# Patient Record
Sex: MALE | Race: BLACK OR AFRICAN AMERICAN | Employment: UNEMPLOYED | URBAN - METROPOLITAN AREA
[De-identification: names, ages, dates, MRNs, and addresses within clinical notes are randomized per-mention and may not be internally consistent; named-entity substitution may affect disease eponyms.]

---

## 2017-05-17 ENCOUNTER — ANESTHESIA EVENT (OUTPATIENT)
Dept: SURGERY | Age: 62
End: 2017-05-17
Payer: MEDICARE

## 2017-05-17 ENCOUNTER — HOSPITAL ENCOUNTER (OUTPATIENT)
Dept: SURGERY | Age: 62
Discharge: HOME | End: 2017-05-17

## 2017-05-17 VITALS
HEART RATE: 54 BPM | HEIGHT: 69 IN | DIASTOLIC BLOOD PRESSURE: 98 MMHG | SYSTOLIC BLOOD PRESSURE: 167 MMHG | TEMPERATURE: 97.4 F | RESPIRATION RATE: 17 BRPM | BODY MASS INDEX: 21.77 KG/M2 | OXYGEN SATURATION: 100 % | WEIGHT: 147 LBS

## 2017-05-17 RX ORDER — SODIUM CHLORIDE 0.9 % (FLUSH) 0.9 %
5-10 SYRINGE (ML) INJECTION AS NEEDED
Status: CANCELLED | OUTPATIENT
Start: 2017-05-17

## 2017-05-17 RX ORDER — SODIUM CHLORIDE, SODIUM LACTATE, POTASSIUM CHLORIDE, CALCIUM CHLORIDE 600; 310; 30; 20 MG/100ML; MG/100ML; MG/100ML; MG/100ML
100 INJECTION, SOLUTION INTRAVENOUS CONTINUOUS
Status: CANCELLED | OUTPATIENT
Start: 2017-05-17 | End: 2017-05-17

## 2017-05-17 RX ORDER — OXYCODONE HYDROCHLORIDE 5 MG/1
10 TABLET ORAL
Status: CANCELLED | OUTPATIENT
Start: 2017-05-17

## 2017-05-17 RX ORDER — HYDROMORPHONE HYDROCHLORIDE 2 MG/ML
0.5 INJECTION, SOLUTION INTRAMUSCULAR; INTRAVENOUS; SUBCUTANEOUS
Status: CANCELLED | OUTPATIENT
Start: 2017-05-17

## 2017-05-17 NOTE — PERIOP NOTES
Patient verified name, , and surgery as listed in [facility]. TYPE  CASE:1B  Orders per surgeon:   Received  Labs per surgeon:NONE  Labs per anesthesia protocol: NONE  EKG  :  NONE      Patient provided with handouts including guide to surgery , transfusions, pain management and hand hygiene for the family and community. Pt verbalizes understanding of all pre-op instructions . Instructed that family must be present in building at all times. Nothing to eat or drink after midnight the night prior to surgery. Hibiclens and instructions given per hospital policy. Instructed patient to continue  previous medications as prescribed prior to surgery and  to take the following medications the day of surgery according to anesthesia guidelines : NONE. BRING EYE DROPS       Original medication prescription bottles NOT visualized during patient appointment. Continue all previous medications unless otherwise directed. Instructed patient to hold  the following medications prior to surgery: NONE      Patient verbalized understanding of all instructions and provided all medical/health information to the best of their ability.

## 2017-05-18 ENCOUNTER — ANESTHESIA (OUTPATIENT)
Dept: SURGERY | Age: 62
End: 2017-05-18
Payer: MEDICARE

## 2017-05-18 ENCOUNTER — HOSPITAL ENCOUNTER (OUTPATIENT)
Age: 62
Setting detail: OUTPATIENT SURGERY
Discharge: HOME | End: 2017-05-18
Attending: OPHTHALMOLOGY | Admitting: OPHTHALMOLOGY
Payer: MEDICARE

## 2017-05-18 VITALS
BODY MASS INDEX: 21.47 KG/M2 | DIASTOLIC BLOOD PRESSURE: 80 MMHG | RESPIRATION RATE: 16 BRPM | TEMPERATURE: 97.6 F | OXYGEN SATURATION: 98 % | HEART RATE: 62 BPM | WEIGHT: 145.38 LBS | SYSTOLIC BLOOD PRESSURE: 150 MMHG

## 2017-05-18 PROCEDURE — 76010000162 HC OR TIME 1.5 TO 2 HR INTENSV-TIER 1: Performed by: OPHTHALMOLOGY

## 2017-05-18 PROCEDURE — 74011250637 HC RX REV CODE- 250/637: Performed by: ANESTHESIOLOGY

## 2017-05-18 PROCEDURE — 77030020143 HC AIRWY LARYN INTUB CGAS -A: Performed by: ANESTHESIOLOGY

## 2017-05-18 PROCEDURE — 74011250636 HC RX REV CODE- 250/636

## 2017-05-18 PROCEDURE — 77030002975 HC SUT PLN J&J -B: Performed by: OPHTHALMOLOGY

## 2017-05-18 PROCEDURE — 77030032490 HC SLV COMPR SCD KNE COVD -B: Performed by: OPHTHALMOLOGY

## 2017-05-18 PROCEDURE — 77030012829 HC CAUT BPLR KIRW -B: Performed by: OPHTHALMOLOGY

## 2017-05-18 PROCEDURE — 77030002916 HC SUT ETHLN J&J -A: Performed by: OPHTHALMOLOGY

## 2017-05-18 PROCEDURE — 77030030827 HC RETRCTR IRIS DISP ALCN -C: Performed by: OPHTHALMOLOGY

## 2017-05-18 PROCEDURE — 77030014436 HC IMPL RETIN SLV DTCH -A: Performed by: OPHTHALMOLOGY

## 2017-05-18 PROCEDURE — 77030013372: Performed by: OPHTHALMOLOGY

## 2017-05-18 PROCEDURE — 77030022403 HC IMPL RETIN STRP DTCH -B: Performed by: OPHTHALMOLOGY

## 2017-05-18 PROCEDURE — 77030032623 HC KT VITRCMY TOT PLS ALCN -F: Performed by: OPHTHALMOLOGY

## 2017-05-18 PROCEDURE — 77030002937 HC SUT MERS J&J -B: Performed by: OPHTHALMOLOGY

## 2017-05-18 PROCEDURE — 77030018837 HC SOL IRR OPTH ALCN -A: Performed by: OPHTHALMOLOGY

## 2017-05-18 PROCEDURE — 77030003029 HC SUT VCRL J&J -B: Performed by: OPHTHALMOLOGY

## 2017-05-18 PROCEDURE — 74011000250 HC RX REV CODE- 250: Performed by: OPHTHALMOLOGY

## 2017-05-18 PROCEDURE — 74011250636 HC RX REV CODE- 250/636: Performed by: ANESTHESIOLOGY

## 2017-05-18 PROCEDURE — 74011000250 HC RX REV CODE- 250

## 2017-05-18 PROCEDURE — 77030018838 HC SOL IRR OPTH ALCN -B: Performed by: OPHTHALMOLOGY

## 2017-05-18 PROCEDURE — 76210000063 HC OR PH I REC FIRST 0.5 HR: Performed by: OPHTHALMOLOGY

## 2017-05-18 PROCEDURE — 74011250636 HC RX REV CODE- 250/636: Performed by: OPHTHALMOLOGY

## 2017-05-18 PROCEDURE — 77030008547 HC TBNG OPHTH BD -A: Performed by: OPHTHALMOLOGY

## 2017-05-18 PROCEDURE — 77030002996 HC SUT SLK J&J -A: Performed by: OPHTHALMOLOGY

## 2017-05-18 PROCEDURE — 77030017621 HC NDL OPHTH1 ALCN -B: Performed by: OPHTHALMOLOGY

## 2017-05-18 PROCEDURE — 76210000020 HC REC RM PH II FIRST 0.5 HR: Performed by: OPHTHALMOLOGY

## 2017-05-18 PROCEDURE — 76060000035 HC ANESTHESIA 2 TO 2.5 HR: Performed by: OPHTHALMOLOGY

## 2017-05-18 DEVICE — STRIP SCLER W3.5XL125MM THK0.75MM SIL SLD STYL 41/S2970: Type: IMPLANTABLE DEVICE | Site: EYE | Status: FUNCTIONAL

## 2017-05-18 DEVICE — SLEEVE SCLER BCKL L30IN OD21IN ID1IN SIL STYL 70: Type: IMPLANTABLE DEVICE | Site: EYE | Status: FUNCTIONAL

## 2017-05-18 RX ORDER — LIDOCAINE HYDROCHLORIDE 20 MG/ML
INJECTION, SOLUTION EPIDURAL; INFILTRATION; INTRACAUDAL; PERINEURAL AS NEEDED
Status: DISCONTINUED | OUTPATIENT
Start: 2017-05-18 | End: 2017-05-18 | Stop reason: HOSPADM

## 2017-05-18 RX ORDER — PHENYLEPHRINE HYDROCHLORIDE 25 MG/ML
1 SOLUTION/ DROPS OPHTHALMIC
Status: DISCONTINUED | OUTPATIENT
Start: 2017-05-18 | End: 2017-05-18 | Stop reason: HOSPADM

## 2017-05-18 RX ORDER — LIDOCAINE HYDROCHLORIDE 20 MG/ML
INJECTION, SOLUTION INFILTRATION; PERINEURAL AS NEEDED
Status: DISCONTINUED | OUTPATIENT
Start: 2017-05-18 | End: 2017-05-18 | Stop reason: HOSPADM

## 2017-05-18 RX ORDER — OFLOXACIN 3 MG/ML
1 SOLUTION/ DROPS OPHTHALMIC
Status: DISCONTINUED | OUTPATIENT
Start: 2017-05-18 | End: 2017-05-18 | Stop reason: HOSPADM

## 2017-05-18 RX ORDER — LIDOCAINE HYDROCHLORIDE 10 MG/ML
0.3 INJECTION INFILTRATION; PERINEURAL ONCE
Status: DISCONTINUED | OUTPATIENT
Start: 2017-05-18 | End: 2017-05-18 | Stop reason: HOSPADM

## 2017-05-18 RX ORDER — SODIUM CHLORIDE 0.9 % (FLUSH) 0.9 %
5-10 SYRINGE (ML) INJECTION AS NEEDED
Status: DISCONTINUED | OUTPATIENT
Start: 2017-05-18 | End: 2017-05-18 | Stop reason: HOSPADM

## 2017-05-18 RX ORDER — MIDAZOLAM HYDROCHLORIDE 1 MG/ML
2 INJECTION, SOLUTION INTRAMUSCULAR; INTRAVENOUS
Status: DISCONTINUED | OUTPATIENT
Start: 2017-05-18 | End: 2017-05-18 | Stop reason: HOSPADM

## 2017-05-18 RX ORDER — FAMOTIDINE 20 MG/1
20 TABLET, FILM COATED ORAL ONCE
Status: COMPLETED | OUTPATIENT
Start: 2017-05-18 | End: 2017-05-18

## 2017-05-18 RX ORDER — GENTAMICIN SULFATE 3 MG/ML
SOLUTION/ DROPS OPHTHALMIC AS NEEDED
Status: DISCONTINUED | OUTPATIENT
Start: 2017-05-18 | End: 2017-05-18 | Stop reason: HOSPADM

## 2017-05-18 RX ORDER — SODIUM CHLORIDE 0.9 % (FLUSH) 0.9 %
5-10 SYRINGE (ML) INJECTION EVERY 8 HOURS
Status: DISCONTINUED | OUTPATIENT
Start: 2017-05-18 | End: 2017-05-18 | Stop reason: HOSPADM

## 2017-05-18 RX ORDER — NEOMYCIN SULFATE, POLYMYXIN B SULFATE, AND DEXAMETHASONE 3.5; 10000; 1 MG/G; [USP'U]/G; MG/G
OINTMENT OPHTHALMIC AS NEEDED
Status: DISCONTINUED | OUTPATIENT
Start: 2017-05-18 | End: 2017-05-18 | Stop reason: HOSPADM

## 2017-05-18 RX ORDER — CYCLOPENTOLATE HYDROCHLORIDE 20 MG/ML
1 SOLUTION/ DROPS OPHTHALMIC
Status: DISCONTINUED | OUTPATIENT
Start: 2017-05-18 | End: 2017-05-18 | Stop reason: HOSPADM

## 2017-05-18 RX ORDER — ONDANSETRON 2 MG/ML
INJECTION INTRAMUSCULAR; INTRAVENOUS AS NEEDED
Status: DISCONTINUED | OUTPATIENT
Start: 2017-05-18 | End: 2017-05-18 | Stop reason: HOSPADM

## 2017-05-18 RX ORDER — HYDROCODONE BITARTRATE AND ACETAMINOPHEN 7.5; 325 MG/1; MG/1
1 TABLET ORAL
Qty: 20 TAB | Refills: 0 | Status: SHIPPED | OUTPATIENT
Start: 2017-05-18 | End: 2018-01-23 | Stop reason: CLARIF

## 2017-05-18 RX ORDER — BETAMETHASONE SODIUM PHOSPHATE AND BETAMETHASONE ACETATE 3; 3 MG/ML; MG/ML
INJECTION, SUSPENSION INTRA-ARTICULAR; INTRALESIONAL; INTRAMUSCULAR; SOFT TISSUE AS NEEDED
Status: DISCONTINUED | OUTPATIENT
Start: 2017-05-18 | End: 2017-05-18 | Stop reason: HOSPADM

## 2017-05-18 RX ORDER — DEXAMETHASONE SODIUM PHOSPHATE 4 MG/ML
INJECTION, SOLUTION INTRA-ARTICULAR; INTRALESIONAL; INTRAMUSCULAR; INTRAVENOUS; SOFT TISSUE AS NEEDED
Status: DISCONTINUED | OUTPATIENT
Start: 2017-05-18 | End: 2017-05-18 | Stop reason: HOSPADM

## 2017-05-18 RX ORDER — BRIMONIDINE TARTRATE 1.5 MG/ML
SOLUTION/ DROPS OPHTHALMIC AS NEEDED
Status: DISCONTINUED | OUTPATIENT
Start: 2017-05-18 | End: 2017-05-18 | Stop reason: HOSPADM

## 2017-05-18 RX ORDER — PROPOFOL 10 MG/ML
INJECTION, EMULSION INTRAVENOUS AS NEEDED
Status: DISCONTINUED | OUTPATIENT
Start: 2017-05-18 | End: 2017-05-18 | Stop reason: HOSPADM

## 2017-05-18 RX ORDER — SODIUM CHLORIDE 9 MG/ML
INJECTION INTRAMUSCULAR; INTRAVENOUS; SUBCUTANEOUS AS NEEDED
Status: DISCONTINUED | OUTPATIENT
Start: 2017-05-18 | End: 2017-05-18 | Stop reason: HOSPADM

## 2017-05-18 RX ORDER — FENTANYL CITRATE 50 UG/ML
INJECTION, SOLUTION INTRAMUSCULAR; INTRAVENOUS AS NEEDED
Status: DISCONTINUED | OUTPATIENT
Start: 2017-05-18 | End: 2017-05-18 | Stop reason: HOSPADM

## 2017-05-18 RX ORDER — CEFAZOLIN SODIUM 1 G/3ML
INJECTION, POWDER, FOR SOLUTION INTRAMUSCULAR; INTRAVENOUS AS NEEDED
Status: DISCONTINUED | OUTPATIENT
Start: 2017-05-18 | End: 2017-05-18 | Stop reason: HOSPADM

## 2017-05-18 RX ORDER — BUPIVACAINE HYDROCHLORIDE 5 MG/ML
INJECTION, SOLUTION EPIDURAL; INTRACAUDAL AS NEEDED
Status: DISCONTINUED | OUTPATIENT
Start: 2017-05-18 | End: 2017-05-18 | Stop reason: HOSPADM

## 2017-05-18 RX ORDER — SODIUM CHLORIDE, SODIUM LACTATE, POTASSIUM CHLORIDE, CALCIUM CHLORIDE 600; 310; 30; 20 MG/100ML; MG/100ML; MG/100ML; MG/100ML
100 INJECTION, SOLUTION INTRAVENOUS CONTINUOUS
Status: DISCONTINUED | OUTPATIENT
Start: 2017-05-18 | End: 2017-05-18 | Stop reason: HOSPADM

## 2017-05-18 RX ADMIN — FAMOTIDINE 20 MG: 20 TABLET ORAL at 11:09

## 2017-05-18 RX ADMIN — LIDOCAINE HYDROCHLORIDE 100 MG: 20 INJECTION, SOLUTION EPIDURAL; INFILTRATION; INTRACAUDAL; PERINEURAL at 13:03

## 2017-05-18 RX ADMIN — FENTANYL CITRATE 50 MCG: 50 INJECTION, SOLUTION INTRAMUSCULAR; INTRAVENOUS at 13:09

## 2017-05-18 RX ADMIN — PROPOFOL 180 MG: 10 INJECTION, EMULSION INTRAVENOUS at 13:03

## 2017-05-18 RX ADMIN — DEXAMETHASONE SODIUM PHOSPHATE 10 MG: 4 INJECTION, SOLUTION INTRA-ARTICULAR; INTRALESIONAL; INTRAMUSCULAR; INTRAVENOUS; SOFT TISSUE at 13:11

## 2017-05-18 RX ADMIN — CYCLOPENTOLATE HYDROCHLORIDE 1 DROP: 20 SOLUTION/ DROPS OPHTHALMIC at 11:03

## 2017-05-18 RX ADMIN — SODIUM CHLORIDE, SODIUM LACTATE, POTASSIUM CHLORIDE, AND CALCIUM CHLORIDE 100 ML/HR: 600; 310; 30; 20 INJECTION, SOLUTION INTRAVENOUS at 11:08

## 2017-05-18 RX ADMIN — ONDANSETRON 4 MG: 2 INJECTION INTRAMUSCULAR; INTRAVENOUS at 13:32

## 2017-05-18 RX ADMIN — FENTANYL CITRATE 50 MCG: 50 INJECTION, SOLUTION INTRAMUSCULAR; INTRAVENOUS at 13:01

## 2017-05-18 RX ADMIN — PHENYLEPHRINE HYDROCHLORIDE 1 DROP: 25 SOLUTION/ DROPS OPHTHALMIC at 11:03

## 2017-05-18 NOTE — ANESTHESIA PREPROCEDURE EVALUATION
Anesthetic History   No history of anesthetic complications            Review of Systems / Medical History  Patient summary reviewed and pertinent labs reviewed    Pulmonary          Smoker         Neuro/Psych       CVA: no residual symptoms       Cardiovascular    Hypertension: well controlled              Exercise tolerance: >4 METS     GI/Hepatic/Renal     GERD: well controlled           Endo/Other             Other Findings              Physical Exam    Airway  Mallampati: II  TM Distance: 4 - 6 cm  Neck ROM: normal range of motion   Mouth opening: Normal     Cardiovascular  Regular rate and rhythm,  S1 and S2 normal,  no murmur, click, rub, or gallop             Dental    Dentition: Poor dentition     Pulmonary  Breath sounds clear to auscultation               Abdominal  GI exam deferred       Other Findings            Anesthetic Plan    ASA: 3  Anesthesia type: general          Induction: Intravenous  Anesthetic plan and risks discussed with: Patient

## 2017-05-18 NOTE — DISCHARGE INSTRUCTIONS
[facility] [address] MD Eda Campos MD [facility]. MD Silvia Draper. Ruben Randolph MD    [phone] or [phone] or [phone] or [phone]    Post Operative Instructions for Retina and Vitreous Surgery Patients    The following are a few guidelines that you should observe for two weeks after surgery:    1. Avoid stooping over, lifting heavy weights or bumping your head. 2.  Special positioning:   Right side face down      May sit up    3. No extensive traveling except what is necessary. If a gas air bubble was put in your      eye at surgery - avoid air travel until you consult your doctor. 4.  You may shave after the third day. 5.  You may watch television, read, cook and wash dishes as long as it does not interfere        with special positioning instructions. 6.  Alcoholic beverages may be used in moderation. 7.  No sexual intercourse. 8.  You  may bathe the eyelids daily with cotton or a tissue moistened with warm tap       water. Do not bathe the eyeball itself. 9.  Some discharge from the operated eye is to be expected. This should gradually        improve. 10. Change the eye pad at least once daily. You may discontinue the eye pad whenever        comfortable to do so (usually three days after the operation). Wear the eye shield or        glasses at all times. 11. If you experience increasing pain, decreasing vision, or pus like discharge, call the        Office. 12. Medication Instructions:         Prednisolone 1% - one drop in the operated eye __4____ times a day. Atropine 1% - one drop in the operated eye at bedtime. Tobradex Ointment - apply in operated eye 4 times a day as needed. Ocuflot______(antibiotic drop) - one drop in operated eye 4 times a day.          BRING ALL EYE DROPS TO YOU POSTOPERATIVE APPOINTMENT! 13. Return appointment at the    [facility]        On  May 19 @ 930am     Voiced or demonstrated understanding:  YES    DISCHARGE SUMMARY from Nurse    PATIENT INSTRUCTIONS:    After general anesthesia or intravenous sedation, for 24 hours or while taking prescription Narcotics:  · Limit your activities  · Do not drive and operate hazardous machinery  · Do not make important personal or business decisions  · Do  not drink alcoholic beverages  · If you have not urinated within 8 hours after discharge, please contact your surgeon on call. *  Please give a list of your current medications to your Primary Care Provider. *  Please update this list whenever your medications are discontinued, doses are      changed, or new medications (including over-the-counter products) are added. *  Please carry medication information at all times in case of emergency situations. These are general instructions for a healthy lifestyle:    No smoking/ No tobacco products/ Avoid exposure to second hand smoke    Surgeon General's Warning:  Quitting smoking now greatly reduces serious risk to your health. Obesity, smoking, and sedentary lifestyle greatly increases your risk for illness    A healthy diet, regular physical exercise & weight monitoring are important for maintaining a healthy lifestyle    You may be retaining fluid if you have a history of heart failure or if you experience any of the following symptoms:  Weight gain of 3 pounds or more overnight or 5 pounds in a week, increased swelling in our hands or feet or shortness of breath while lying flat in bed. Please call your doctor as soon as you notice any of these symptoms; do not wait until your next office visit.     Recognize signs and symptoms of STROKE:    F-face looks uneven    A-arms unable to move or move unevenly    S-speech slurred or non-existent    T-time-call 911 as soon as signs and symptoms begin-DO NOT go Back to bed or wait to see if you get better-TIME IS BRAIN.

## 2017-05-18 NOTE — BRIEF OP NOTE
BRIEF OPERATIVE NOTE    Date of Procedure: 5/18/2017   Preoperative Diagnosis: Retinal detachment, tractional, right [H33.41]  Postoperative Diagnosis: Retinal detachment, tractional, right [H33.41]    Procedure(s):  VITRECTOMY POSTERIOR 25 GAUGE/ LASER/ GAS FLUID EXCHANGE-C3F8/ LENSECTOMY/ SCLERAL BUCKLE PLACEMENT/ RIGHT  Surgeon(s) and Role:     * Joss Wagner MD - Primary         Assistant Staff:       Surgical Staff:  Circ-1: Zachery Solorzano RN  Scrub Tech-1: Sobieski Cole  Event Time In   Incision Start 1309   Incision Close 1446     Anesthesia: General   Estimated Blood Loss: 0  Specimens: * No specimens in log *   Findings: 0   Complications: 0  Implants:   Implant Name Type Inv. Item Serial No.  Lot No. LRB No. Used Action   IMPL RETIN SLV RND 1X2.1X30MM --  - [id number]  IMPL RETIN SLV RND 1X2.1X30MM --   [facility] [id number] Right 1 Implanted   IMPL RETIN STRP 0.75X3. [id number] --  - [id number]   IMPL RETIN STRP 0.75X3. [id number] --    [facility] [id number] Right 1 Implanted

## 2017-05-18 NOTE — IP AVS SNAPSHOT
Albino Bradford 
 
 
 [address] 
[phone] Patient: Ivett Mora MRN: [id number] IUO:[id number]/[id number] You are allergic to the following Allergen Reactions Other Medication Other (comments) Hayfever, mold, ragweed, environmental and some foods, but patient not sure which ones. Causes him to become stopped up in sinuses per pt. Recent Documentation Weight BMI Smoking Status 65.9 kg 21.47 kg/m2 Current Every Day Smoker Emergency Contacts Name Discharge Info Relation Home Work Mobile Yary Lawson  Other Relative [6] [phone] About your hospitalization You were admitted on:  May 18, 2017 [facility] You were discharged on:  May 18, 2017 Unit phone number:  [phone] Why you were hospitalized Your primary diagnosis was:  Not on File Providers Seen During Your Hospitalizations Provider Role Specialty Primary office phone Andres Wylie MD Attending Provider Ophthalmology [phone] Your Primary Care Physician (PCP) Primary Care Physician Office Phone Office Fax Gerhardt Blend [phone] Follow-up Information Follow up With Details Comments Contact Info Perry Canchola MD   [facility][1] 
[address] [facility] [address] [phone] Current Discharge Medication List  
  
START taking these medications Dose & Instructions Dispensing Information Comments Morning Noon Evening Bedtime HYDROcodone-acetaminophen 7.5-325 mg per tablet Commonly known as:  Vasile Ngo Your last dose was: Your next dose is:    
   
   
 Dose:  1 Tab Take 1 Tab by mouth every six (6) hours as needed for Pain. Max Daily Amount: 4 Tabs. Quantity:  20 Tab Refills:  0 CONTINUE these medications which have NOT CHANGED Dose & Instructions Dispensing Information Comments Morning Noon Evening Bedtime BENADRYL 25 mg capsule Generic drug:  diphenhydrAMINE Your last dose was: Your next dose is:    
   
   
 Dose:  25 mg Take 25 mg by mouth daily as needed. Refills:  0 Where to Get Your Medications Information on where to get these meds will be given to you by the nurse or doctor. ! Ask your nurse or doctor about these medications HYDROcodone-acetaminophen 7.5-325 mg per tablet Discharge Instructions [facility] [place] [address] MD Royce Marx MD Yancey Hickory. Stalin Canchola MD                    [facility]. Patricia Mckeon, [address] or [phone] or [phone] or [phone] Post Operative Instructions for Retina and Vitreous Surgery Patients The following are a few guidelines that you should observe for two weeks after surgery: 1. Avoid stooping over, lifting heavy weights or bumping your head. 2.  Special positioning:   Right side face down      May sit up 3. No extensive traveling except what is necessary. If a gas air bubble was put in your      eye at surgery - avoid air travel until you consult your doctor. 4.  You may shave after the third day. 5.  You may watch television, read, cook and wash dishes as long as it does not interfere 
      with special positioning instructions. 6.  Alcoholic beverages may be used in moderation. 7.  No sexual intercourse. 8.  You  may bathe the eyelids daily with cotton or a tissue moistened with warm tap 
     water. Do not bathe the eyeball itself. 9.  Some discharge from the operated eye is to be expected. This should gradually  
     improve. 10. Change the eye pad at least once daily. You may discontinue the eye pad whenever 
      comfortable to do so (usually three days after the operation). Wear the eye shield or 
      glasses at all times. 11. If you experience increasing pain, decreasing vision, or pus like discharge, call the 
      Office. 12. Medication Instructions: 
       Prednisolone 1% - one drop in the operated eye __4____ times a day. Atropine 1% - one drop in the operated eye at bedtime. Tobradex Ointment - apply in operated eye 4 times a day as needed. Ocuflot______(antibiotic drop) - one drop in operated eye 4 times a day. BRING ALL EYE DROPS TO YOU POSTOPERATIVE APPOINTMENT! 13. Return appointment at the    [facility] On  May 19 @ 930am    
Voiced or demonstrated understanding:  YES 
 
DISCHARGE SUMMARY from Nurse PATIENT INSTRUCTIONS: 
 
After general anesthesia or intravenous sedation, for 24 hours or while taking prescription Narcotics: · Limit your activities · Do not drive and operate hazardous machinery · Do not make important personal or business decisions · Do  not drink alcoholic beverages · If you have not urinated within 8 hours after discharge, please contact your surgeon on call. *  Please give a list of your current medications to your Primary Care Provider. *  Please update this list whenever your medications are discontinued, doses are 
    changed, or new medications (including over-the-counter products) are added. *  Please carry medication information at all times in case of emergency situations. These are general instructions for a healthy lifestyle: No smoking/ No tobacco products/ Avoid exposure to second hand smoke Surgeon General's Warning:  Quitting smoking now greatly reduces serious risk to your health. Obesity, smoking, and sedentary lifestyle greatly increases your risk for illness A healthy diet, regular physical exercise & weight monitoring are important for maintaining a healthy lifestyle You may be retaining fluid if you have a history of heart failure or if you experience any of the following symptoms:  Weight gain of 3 pounds or more overnight or 5 pounds in a week, increased swelling in our hands or feet or shortness of breath while lying flat in bed. Please call your doctor as soon as you notice any of these symptoms; do not wait until your next office visit. Recognize signs and symptoms of STROKE: 
 
F-face looks uneven A-arms unable to move or move unevenly S-speech slurred or non-existent T-time-call 911 as soon as signs and symptoms begin-DO NOT go Back to bed or wait to see if you get better-TIME IS BRAIN. Discharge Orders None Introducing [facility] SERVICES! Laurel Loza introduces GoInstant patient portal. Now you can access parts of your medical record, email your doctor's office, and request medication refills online. 1. In your internet browser, go to [email or web address] 2. Click on the First Time User? Click Here link in the Sign In box. You will see the New Member Sign Up page. 3. Enter your GoInstant Access Code exactly as it appears below. You will not need to use this code after youve completed the sign-up process. If you do not sign up before the expiration date, you must request a new code. · GoInstant Access Code: [email or web address] Expires: 8/10/2017  1:27 PM 
 
4. Enter the last four digits of your Social Security Number (xxxx) and Date of Birth (mm/dd/yyyy) as indicated and click Submit. You will be taken to the next sign-up page. 5. Create a GoInstant ID. This will be your GoInstant login ID and cannot be changed, so think of one that is secure and easy to remember. 6. Create a WaveSyndicatet password. You can change your password at any time. 7. Enter your Password Reset Question and Answer. This can be used at a later time if you forget your password. 8. Enter your e-mail address. You will receive e-mail notification when new information is available in [address] 9. Click Sign Up. You can now view and download portions of your medical record. 10. Click the Download Summary menu link to download a portable copy of your medical information. If you have questions, please visit the Frequently Asked Questions section of the [employer] website. Remember, [employer] is NOT to be used for urgent needs. For medical emergencies, dial 911. Now available from your iPhone and Android! General Information Please provide this summary of care documentation to your next provider. Patient Signature:  ____________________________________________________________ Date:  ____________________________________________________________  
  
[facility] Provider Signature:  ____________________________________________________________ Date:  ____________________________________________________________

## 2017-05-18 NOTE — IP AVS SNAPSHOT
Current Discharge Medication List  
  
START taking these medications Dose & Instructions Dispensing Information Comments Morning Noon Evening Bedtime HYDROcodone-acetaminophen 7.5-325 mg per tablet Commonly known as:  Joaquin Sauceda Your last dose was: Your next dose is:    
   
   
 Dose:  1 Tab Take 1 Tab by mouth every six (6) hours as needed for Pain. Max Daily Amount: 4 Tabs. Quantity:  20 Tab Refills:  0 CONTINUE these medications which have NOT CHANGED Dose & Instructions Dispensing Information Comments Morning Noon Evening Bedtime BENADRYL 25 mg capsule Generic drug:  diphenhydrAMINE Your last dose was: Your next dose is:    
   
   
 Dose:  25 mg Take 25 mg by mouth daily as needed. Refills:  0 Where to Get Your Medications Information on where to get these meds will be given to you by the nurse or doctor. ! Ask your nurse or doctor about these medications HYDROcodone-acetaminophen 7.5-325 mg per tablet

## 2017-05-19 NOTE — OP NOTES
[facility] [address]   OPERATIVE REPORT       Name:  Ella Michelle   MR#:  [id number]   :  1955   Account #:  [de-identified]   Date of Adm:  2017       PREPROCEDURE DIAGNOSES:   1. Recurrent retinal detachment, macula off, right eye. 2. Dense nuclear sclerotic cataract, right eye.   3. 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 POSTPROCEDURE DIAGNOSES:   1. Recurrent retinal detachment, macula off, right eye. 2. Dense nuclear sclerotic cataract, right eye.   3. 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 NAME OF PROCEDURE:   1. Pars plana vitrectomy with repair of complex retinal   detachment utilizing laser, fluid gas exchange, and scleral   buckle. 2. Lensectomy all in the right. SURGEON: Loyda Hsieh MD.    ANESTHESIA: General laryngotracheal mask. COMPLICATIONS: None. INDICATIONS FOR PROCEDURE: Significant recurrent inferior   detachment in an eye, which had a recent giant retinal tear,   retinal detachment. The giant tear appeared stable and flat but   he had fluid extending from approximately 2 o'clock to 9 o'clock   with some elevation of laser, especially in the nasal periphery. I did not see any definite open breaks, but visualization was   difficult due to the dense cataract which was present. Discussing his options, risks and benefits, I recommended   vitrectomy with scleral buckling and possible oil, but I thought   that I would try to avoid the oil if possible because the level   of PVR did not seem to be dramatic. Understanding the risks,   benefits, and alternatives, the patient elected to proceed. SUMMARY OF THE OPERATION: After adequate preoperative evaluation   and informed consent had been obtained, the patient was brought   to the operative suite. IV access and sedation was obtained   without complication. The operative site was verified as the   right eye.  The eye was prepped and after induction of general   laryngotracheal mask anesthesia retrobulbar injection consisting   of a 50/50 mixture of Marcaine and Xylocaine with Wydase was   given. Good anesthesia and akinesia noted. The eye was prepped   and draped in the usual fashion for vitreoretinal surgery. Lid   speculum was placed in the floor of the eye and indirect   ophthalmoscopy confirmed the presence of the previously   mentioned detachment. I still cannot identify defect. The o pupil   was mid dilated with some difficulty with peripheral   visualization. At this time, I did go ahead and the conjunctiva   was opened 360 degrees, all muscles with 2-0 silk sutures. Quadrants were inspected and were healthy. Fairly large eye. Marking   depressor was used to anh the posterior aspect of the previous   laser, I felt this would be a good position for buckle   placement. A 41 band was placed around the eye, joined   superonasally with a 70 sleeve and fixed in all quadrants with   5-0 Mersilene sutures. It was not pulled tight at this time. Three separate 25-gauge cannulas were placed. Infusion was   verified and turned to a pressure of 35 mmHg. It was felt with   the visualization at this time that lensectomy was the next   step, therefore, 20-gauge MVR blade was used to create a   sclerotomy as well as to incise the lens capsule. The fragmatome   was then used to o complete fragmatome. This lens was actually   fairly dense. Good release of all lens material was removed with   the presence of anterior PVR I did go ahead and do the   capsulectomy as well. During the fragmatome, the pupil did began   to come down and was it necessary to support the anterior   chamber with viscoelastic as well as to place 3 iris retractors. All lens material was removed from the eye and indirect   ophthalmoscopy then was used to investigate the the detachment. The previously mentioned detachment was present.  There was   elevation with what appeared to be some loop traction   supranasally with several small holes in the areas of previous   laser treatment, but no other defects were seen. The buckle was   then pulled to an appropriate height to support this anterior   traction and there was no real traction that could be released   with the microvitrectomy instrument. The macula was thin and   cystic. The nerve still seemed pink with some pigment on the   surface. A retinotomy was then created just posterior to the   buckle nasally and the retina was drained under liquid, good   drainage, and complete reattachment was achieved. A fluid-air   exchange was then performed. I did use a microvitrectomy   instrument to remove the viscoelastic prior to performing   fluid/air exchange. Good drainage of fairly viscous subretinal   fluid was achieved and a nice reattachment of the retina was   noted. Laser was then placed around the retinotomy site as well   as in the supranasal quadrant and slight posterior laser   treatment around the previously-placed laser in case any other   small defects were seen. The buckle had been pulled and all   sutures were trimmed and tied it felt this was an excellent   result. A 40 mL exchange of 10% C3F8 was then carried out. The   eye was left at a pressure of less than 17 mmHg after closing   all sclerotomy sites at the end with 7-0 Vicryl. The conjunctiva   was closed with 6-0 plain after irrigating the quadrants with   Ancef and Celestone. The eye was patched and shielded with   Maxitrol ointment as well as Alphagan, Cyclogyl and Maxitrol   ointment. He was taken to recovery in good condition.         MD Ari Mcmillan / Endy   D:  05/18/2017   15:01   T:  05/19/2017   09:45   Job #:  [id number]

## 2018-01-23 ENCOUNTER — HOSPITAL ENCOUNTER (OUTPATIENT)
Dept: SURGERY | Age: 63
Discharge: HOME | End: 2018-01-23

## 2018-01-23 VITALS
SYSTOLIC BLOOD PRESSURE: 147 MMHG | BODY MASS INDEX: 20.96 KG/M2 | OXYGEN SATURATION: 99 % | HEART RATE: 59 BPM | RESPIRATION RATE: 20 BRPM | HEIGHT: 70 IN | WEIGHT: 146.44 LBS | DIASTOLIC BLOOD PRESSURE: 87 MMHG | TEMPERATURE: 97.6 F

## 2018-01-23 RX ORDER — METHOCARBAMOL 500 MG/1
500 TABLET, FILM COATED ORAL
COMMUNITY

## 2018-01-23 RX ORDER — LANOLIN ALCOHOL/MO/W.PET/CERES
400 CREAM (GRAM) TOPICAL DAILY
COMMUNITY

## 2018-01-23 RX ORDER — CIPROFLOXACIN HYDROCHLORIDE 3.5 MG/ML
1 SOLUTION/ DROPS TOPICAL
Status: CANCELLED | OUTPATIENT
Start: 2018-01-25

## 2018-01-23 NOTE — PERIOP NOTES
Patient verified name, , and surgery as listed in [facility]. Patient provided medical/health information and PTA medications to the best of their ability. TYPE  CASE:1b  Orders per surgeon: Received  and dated 18. Labs per surgeon:none  Labs per anesthesia protocol: none  EKG  :  none    Patient provided with and instructed on education handouts including Guide to Surgery, blood transfusions, pain management, and hand hygiene for the family and community, and [facility] brochure. Nicole mist soap and instructions given per hospital policy. Instructed patient to continue previous medications as prescribed prior to surgery unless otherwise directed and to take the following medications the day of surgery according to anesthesia guidelines : none. Bring eye drops on the dos . Instructed patient to hold  the following medications: none. Original medication prescription bottles not visualized during patient appointment. Patient teach back successful and patient demonstrates knowledge of instruction.

## 2018-01-25 ENCOUNTER — ANESTHESIA (OUTPATIENT)
Dept: SURGERY | Age: 63
End: 2018-01-25
Payer: MEDICARE

## 2018-01-25 ENCOUNTER — ANESTHESIA EVENT (OUTPATIENT)
Dept: SURGERY | Age: 63
End: 2018-01-25
Payer: MEDICARE

## 2018-01-25 ENCOUNTER — HOSPITAL ENCOUNTER (OUTPATIENT)
Age: 63
Setting detail: OUTPATIENT SURGERY
Discharge: HOME | End: 2018-01-25
Attending: OPHTHALMOLOGY | Admitting: OPHTHALMOLOGY
Payer: MEDICARE

## 2018-01-25 VITALS
OXYGEN SATURATION: 97 % | TEMPERATURE: 97.8 F | DIASTOLIC BLOOD PRESSURE: 72 MMHG | RESPIRATION RATE: 22 BRPM | BODY MASS INDEX: 21.25 KG/M2 | HEART RATE: 73 BPM | SYSTOLIC BLOOD PRESSURE: 121 MMHG | WEIGHT: 146 LBS

## 2018-01-25 PROCEDURE — 77030003029 HC SUT VCRL J&J -B: Performed by: OPHTHALMOLOGY

## 2018-01-25 PROCEDURE — 77030002917 HC SUT ETHLN J&J -B: Performed by: OPHTHALMOLOGY

## 2018-01-25 PROCEDURE — 77030018838 HC SOL IRR OPTH ALCN -B: Performed by: OPHTHALMOLOGY

## 2018-01-25 PROCEDURE — 77030026083 HC FCPS OPHTH GRSH DSP ALCN -C: Performed by: OPHTHALMOLOGY

## 2018-01-25 PROCEDURE — 77030006694 HC BLD OPHTH CRESC ALCN -B: Performed by: OPHTHALMOLOGY

## 2018-01-25 PROCEDURE — 77030018846 HC SOL IRR STRL H20 ICUM -A: Performed by: OPHTHALMOLOGY

## 2018-01-25 PROCEDURE — V2630 ANTER CHAMBER INTRAOCUL LENS: HCPCS | Performed by: OPHTHALMOLOGY

## 2018-01-25 PROCEDURE — 77030008546 HC TBNG OPHTH ALCN -B: Performed by: OPHTHALMOLOGY

## 2018-01-25 PROCEDURE — 76210000063 HC OR PH I REC FIRST 0.5 HR: Performed by: OPHTHALMOLOGY

## 2018-01-25 PROCEDURE — 77030038091: Performed by: OPHTHALMOLOGY

## 2018-01-25 PROCEDURE — 74011250636 HC RX REV CODE- 250/636

## 2018-01-25 PROCEDURE — 77030016570 HC BLNKT BAIR HGGR 3M -B: Performed by: NURSE ANESTHETIST, CERTIFIED REGISTERED

## 2018-01-25 PROCEDURE — 77030020143 HC AIRWY LARYN INTUB CGAS -A: Performed by: NURSE ANESTHETIST, CERTIFIED REGISTERED

## 2018-01-25 PROCEDURE — 74011250636 HC RX REV CODE- 250/636: Performed by: OPHTHALMOLOGY

## 2018-01-25 PROCEDURE — 74011000250 HC RX REV CODE- 250: Performed by: OPHTHALMOLOGY

## 2018-01-25 PROCEDURE — 76060000033 HC ANESTHESIA 1 TO 1.5 HR: Performed by: OPHTHALMOLOGY

## 2018-01-25 PROCEDURE — 77030006685 HC BLD OPHTH ALCN -B: Performed by: OPHTHALMOLOGY

## 2018-01-25 PROCEDURE — 77030018837 HC SOL IRR OPTH ALCN -A: Performed by: OPHTHALMOLOGY

## 2018-01-25 PROCEDURE — 77030006704 HC BLD OPHTH SLT ALCN -B: Performed by: OPHTHALMOLOGY

## 2018-01-25 PROCEDURE — 74011250636 HC RX REV CODE- 250/636: Performed by: ANESTHESIOLOGY

## 2018-01-25 PROCEDURE — 74011000250 HC RX REV CODE- 250

## 2018-01-25 PROCEDURE — 74011000254 HC RX REV CODE- 254: Performed by: OPHTHALMOLOGY

## 2018-01-25 PROCEDURE — 76010000149 HC OR TIME 1 TO 1.5 HR: Performed by: OPHTHALMOLOGY

## 2018-01-25 PROCEDURE — 77030032490 HC SLV COMPR SCD KNE COVD -B: Performed by: OPHTHALMOLOGY

## 2018-01-25 PROCEDURE — 77030032513 HC KNF OPHTH STAB DISP WALC -A: Performed by: OPHTHALMOLOGY

## 2018-01-25 PROCEDURE — 76210000020 HC REC RM PH II FIRST 0.5 HR: Performed by: OPHTHALMOLOGY

## 2018-01-25 PROCEDURE — 77030032623 HC KT VITRCMY TOT PLS ALCN -F: Performed by: OPHTHALMOLOGY

## 2018-01-25 PROCEDURE — 77030008547 HC TBNG OPHTH BD -A: Performed by: OPHTHALMOLOGY

## 2018-01-25 DEVICE — IMPLANTABLE DEVICE: Type: IMPLANTABLE DEVICE | Status: FUNCTIONAL

## 2018-01-25 RX ORDER — ONDANSETRON 2 MG/ML
INJECTION INTRAMUSCULAR; INTRAVENOUS AS NEEDED
Status: DISCONTINUED | OUTPATIENT
Start: 2018-01-25 | End: 2018-01-25 | Stop reason: HOSPADM

## 2018-01-25 RX ORDER — INDOCYANINE GREEN AND WATER 25 MG
KIT INJECTION AS NEEDED
Status: DISCONTINUED | OUTPATIENT
Start: 2018-01-25 | End: 2018-01-25 | Stop reason: HOSPADM

## 2018-01-25 RX ORDER — SODIUM CHLORIDE 0.9 % (FLUSH) 0.9 %
5-10 SYRINGE (ML) INJECTION AS NEEDED
Status: DISCONTINUED | OUTPATIENT
Start: 2018-01-25 | End: 2018-01-25 | Stop reason: HOSPADM

## 2018-01-25 RX ORDER — SODIUM CHLORIDE, SODIUM LACTATE, POTASSIUM CHLORIDE, CALCIUM CHLORIDE 600; 310; 30; 20 MG/100ML; MG/100ML; MG/100ML; MG/100ML
75 INJECTION, SOLUTION INTRAVENOUS CONTINUOUS
Status: DISCONTINUED | OUTPATIENT
Start: 2018-01-25 | End: 2018-01-25 | Stop reason: HOSPADM

## 2018-01-25 RX ORDER — NEOMYCIN SULFATE, POLYMYXIN B SULFATE, AND DEXAMETHASONE 3.5; 10000; 1 MG/G; [USP'U]/G; MG/G
OINTMENT OPHTHALMIC AS NEEDED
Status: DISCONTINUED | OUTPATIENT
Start: 2018-01-25 | End: 2018-01-25 | Stop reason: HOSPADM

## 2018-01-25 RX ORDER — SODIUM CHLORIDE 9 MG/ML
INJECTION INTRAMUSCULAR; INTRAVENOUS; SUBCUTANEOUS AS NEEDED
Status: DISCONTINUED | OUTPATIENT
Start: 2018-01-25 | End: 2018-01-25 | Stop reason: HOSPADM

## 2018-01-25 RX ORDER — PROPOFOL 10 MG/ML
INJECTION, EMULSION INTRAVENOUS AS NEEDED
Status: DISCONTINUED | OUTPATIENT
Start: 2018-01-25 | End: 2018-01-25 | Stop reason: HOSPADM

## 2018-01-25 RX ORDER — GLYCOPYRROLATE 0.2 MG/ML
INJECTION INTRAMUSCULAR; INTRAVENOUS AS NEEDED
Status: DISCONTINUED | OUTPATIENT
Start: 2018-01-25 | End: 2018-01-25 | Stop reason: HOSPADM

## 2018-01-25 RX ORDER — CYCLOPENTOLATE HYDROCHLORIDE 20 MG/ML
1 SOLUTION/ DROPS OPHTHALMIC
Status: DISCONTINUED | OUTPATIENT
Start: 2018-01-25 | End: 2018-01-25 | Stop reason: HOSPADM

## 2018-01-25 RX ORDER — OXYCODONE HYDROCHLORIDE 5 MG/1
5 TABLET ORAL
Status: DISCONTINUED | OUTPATIENT
Start: 2018-01-25 | End: 2018-01-25 | Stop reason: HOSPADM

## 2018-01-25 RX ORDER — LIDOCAINE HYDROCHLORIDE 20 MG/ML
INJECTION, SOLUTION EPIDURAL; INFILTRATION; INTRACAUDAL; PERINEURAL AS NEEDED
Status: DISCONTINUED | OUTPATIENT
Start: 2018-01-25 | End: 2018-01-25 | Stop reason: HOSPADM

## 2018-01-25 RX ORDER — HYDROMORPHONE HYDROCHLORIDE 2 MG/ML
0.5 INJECTION, SOLUTION INTRAMUSCULAR; INTRAVENOUS; SUBCUTANEOUS
Status: DISCONTINUED | OUTPATIENT
Start: 2018-01-25 | End: 2018-01-25 | Stop reason: HOSPADM

## 2018-01-25 RX ORDER — LIDOCAINE HYDROCHLORIDE 20 MG/ML
INJECTION, SOLUTION INFILTRATION; PERINEURAL AS NEEDED
Status: DISCONTINUED | OUTPATIENT
Start: 2018-01-25 | End: 2018-01-25 | Stop reason: HOSPADM

## 2018-01-25 RX ORDER — PHENYLEPHRINE HYDROCHLORIDE 25 MG/ML
1 SOLUTION/ DROPS OPHTHALMIC
Status: DISCONTINUED | OUTPATIENT
Start: 2018-01-25 | End: 2018-01-25 | Stop reason: HOSPADM

## 2018-01-25 RX ORDER — BETAMETHASONE SODIUM PHOSPHATE AND BETAMETHASONE ACETATE 3; 3 MG/ML; MG/ML
INJECTION, SUSPENSION INTRA-ARTICULAR; INTRALESIONAL; INTRAMUSCULAR; SOFT TISSUE AS NEEDED
Status: DISCONTINUED | OUTPATIENT
Start: 2018-01-25 | End: 2018-01-25 | Stop reason: HOSPADM

## 2018-01-25 RX ORDER — FENTANYL CITRATE 50 UG/ML
INJECTION, SOLUTION INTRAMUSCULAR; INTRAVENOUS AS NEEDED
Status: DISCONTINUED | OUTPATIENT
Start: 2018-01-25 | End: 2018-01-25 | Stop reason: HOSPADM

## 2018-01-25 RX ORDER — EPHEDRINE SULFATE 50 MG/ML
INJECTION, SOLUTION INTRAVENOUS AS NEEDED
Status: DISCONTINUED | OUTPATIENT
Start: 2018-01-25 | End: 2018-01-25 | Stop reason: HOSPADM

## 2018-01-25 RX ORDER — OXYCODONE AND ACETAMINOPHEN 10; 325 MG/1; MG/1
1 TABLET ORAL AS NEEDED
Status: DISCONTINUED | OUTPATIENT
Start: 2018-01-25 | End: 2018-01-25 | Stop reason: HOSPADM

## 2018-01-25 RX ORDER — CEFAZOLIN SODIUM 1 G/3ML
INJECTION, POWDER, FOR SOLUTION INTRAMUSCULAR; INTRAVENOUS AS NEEDED
Status: DISCONTINUED | OUTPATIENT
Start: 2018-01-25 | End: 2018-01-25 | Stop reason: HOSPADM

## 2018-01-25 RX ADMIN — FENTANYL CITRATE 50 MCG: 50 INJECTION, SOLUTION INTRAMUSCULAR; INTRAVENOUS at 13:27

## 2018-01-25 RX ADMIN — FENTANYL CITRATE 25 MCG: 50 INJECTION, SOLUTION INTRAMUSCULAR; INTRAVENOUS at 14:12

## 2018-01-25 RX ADMIN — CYCLOPENTOLATE HYDROCHLORIDE 1 DROP: 20 SOLUTION/ DROPS OPHTHALMIC at 11:20

## 2018-01-25 RX ADMIN — FENTANYL CITRATE 25 MCG: 50 INJECTION, SOLUTION INTRAMUSCULAR; INTRAVENOUS at 13:43

## 2018-01-25 RX ADMIN — FENTANYL CITRATE 25 MCG: 50 INJECTION, SOLUTION INTRAMUSCULAR; INTRAVENOUS at 14:30

## 2018-01-25 RX ADMIN — PROPOFOL 200 MG: 10 INJECTION, EMULSION INTRAVENOUS at 13:38

## 2018-01-25 RX ADMIN — ONDANSETRON 4 MG: 2 INJECTION INTRAMUSCULAR; INTRAVENOUS at 14:15

## 2018-01-25 RX ADMIN — PHENYLEPHRINE HYDROCHLORIDE 1 DROP: 25 SOLUTION/ DROPS OPHTHALMIC at 11:20

## 2018-01-25 RX ADMIN — EPHEDRINE SULFATE 10 MG: 50 INJECTION, SOLUTION INTRAVENOUS at 13:57

## 2018-01-25 RX ADMIN — LIDOCAINE HYDROCHLORIDE 100 MG: 20 INJECTION, SOLUTION EPIDURAL; INFILTRATION; INTRACAUDAL; PERINEURAL at 13:38

## 2018-01-25 RX ADMIN — FENTANYL CITRATE 25 MCG: 50 INJECTION, SOLUTION INTRAMUSCULAR; INTRAVENOUS at 14:05

## 2018-01-25 RX ADMIN — EPHEDRINE SULFATE 5 MG: 50 INJECTION, SOLUTION INTRAVENOUS at 14:38

## 2018-01-25 RX ADMIN — SODIUM CHLORIDE, SODIUM LACTATE, POTASSIUM CHLORIDE, AND CALCIUM CHLORIDE 75 ML/HR: 600; 310; 30; 20 INJECTION, SOLUTION INTRAVENOUS at 11:19

## 2018-01-25 RX ADMIN — EPHEDRINE SULFATE 10 MG: 50 INJECTION, SOLUTION INTRAVENOUS at 13:46

## 2018-01-25 RX ADMIN — GLYCOPYRROLATE 0.1 MG: 0.2 INJECTION INTRAMUSCULAR; INTRAVENOUS at 13:51

## 2018-01-25 NOTE — DISCHARGE INSTRUCTIONS
[facility], [facility] MD Garrett Raza MD Furman Sprang. Kalvin Booker, MD Charity Osler. Sampson Madrid MD    [phone] or [phone] or [phone] or [phone]    Post Operative Instructions for Retina and Vitreous Surgery Patients    The following are a few guidelines that you should observe for two weeks after surgery:    1. Avoid stooping over, lifting heavy weights or bumping your head. 2.  Special positioning: Keep head elevated on 2-3 pillows. 3.  No extensive traveling except what is necessary. If a gas air bubble was put in your eye at surgery - avoid air travel until you consult your doctor. 4.  You may shave after the third day. 5.  You may watch television, read, cook and wash dishes as long as it does not interfere        with special positioning instructions. 6.  Alcoholic beverages may be used in moderation. 7.  No sexual intercourse. 8.  You  may bathe the eyelids daily with cotton or a tissue moistened with warm tap       water. Do not bathe the eyeball itself. 9.  Some discharge from the operated eye is to be expected. This should gradually        improve. 10. Change the eye pad at least once daily. You may discontinue the eye pad whenever        comfortable to do so (usually three days after the operation). Wear the eye shield or        glasses at all times. 11. If you experience increasing pain, decreasing vision, or pus like discharge, call the office. 12. Medication Instructions:         Prednisolone 1% - one drop in the operated eye 4 times a day. Ofloxacin (antibiotic drop) - one drop in operated eye 4 times a day. BRING ALL EYE DROPS TO YOU POSTOPERATIVE APPOINTMENT!     13. Return appointment at the [facility]        On  Friday, January 26 @ 9:30    DISCHARGE SUMMARY from Nurse    PATIENT INSTRUCTIONS:    After general anesthesia or intravenous sedation, for 24 hours or while taking prescription Narcotics:  · Limit your activities  · Do not drive and operate hazardous machinery  · Do not make important personal or business decisions  · Do  not drink alcoholic beverages  · If you have not urinated within 8 hours after discharge, please contact your surgeon on call. *  Please give a list of your current medications to your Primary Care Provider. *  Please update this list whenever your medications are discontinued, doses are      changed, or new medications (including over-the-counter products) are added. *  Please carry medication information at all times in case of emergency situations. These are general instructions for a healthy lifestyle:    No smoking/ No tobacco products/ Avoid exposure to second hand smoke    Surgeon General's Warning:  Quitting smoking now greatly reduces serious risk to your health. Obesity, smoking, and sedentary lifestyle greatly increases your risk for illness    A healthy diet, regular physical exercise & weight monitoring are important for maintaining a healthy lifestyle    You may be retaining fluid if you have a history of heart failure or if you experience any of the following symptoms:  Weight gain of 3 pounds or more overnight or 5 pounds in a week, increased swelling in our hands or feet or shortness of breath while lying flat in bed. Please call your doctor as soon as you notice any of these symptoms; do not wait until your next office visit. Recognize signs and symptoms of STROKE:    F-face looks uneven    A-arms unable to move or move unevenly    S-speech slurred or non-existent    T-time-call 911 as soon as signs and symptoms begin-DO NOT go       Back to bed or wait to see if you get better-TIME IS BRAIN.

## 2018-01-25 NOTE — IP AVS SNAPSHOT
[address] 
[phone] Patient: Bud Hickman MRN: [id number] BPA:[id number] About your hospitalization You were admitted on:  January 25, 2018 You last received care in the:  [facility] OP PACU You were discharged on:  January 25, 2018 Why you were hospitalized Your primary diagnosis was:  Not on File Follow-up Information Follow up With Details Comments Contact Info Ashli Boo MD   [address] [facility] [address] [address] 
[phone] Discharge Orders None A check anh indicates which time of day the medication should be taken. My Medications CONTINUE taking these medications Instructions Each Dose to Equal  
 Morning Noon Evening Bedtime BENADRYL 25 mg capsule Generic drug:  diphenhydrAMINE Your last dose was: Your next dose is: Take 25 mg by mouth daily as needed. 25 mg  
    
   
   
   
  
 magnesium oxide 400 mg tablet Commonly known as:  MAG-OX Your last dose was: Your next dose is: Take 400 mg by mouth daily. Indications: am-takes for [employer] 400 mg  
    
   
   
   
  
 methocarbamol 500 mg tablet Commonly known as:  ROBAXIN Your last dose was: Your next dose is: Take 500 mg by mouth daily as needed. 500 mg Discharge Instructions [facility] [place], [place] MD Tessa Enciso MD Dwaine Levan. MD Virgilio Lentz. [facility], [address] [facility] 74 or [phone] or [phone] or [phone] Post Operative Instructions for Retina and Vitreous Surgery Patients The following are a few guidelines that you should observe for two weeks after surgery: 1.  Avoid stooping over, lifting heavy weights or bumping your head. 2.  Special positioning: Keep head elevated on 2-3 pillows. 3.  No extensive traveling except what is necessary. If a gas air bubble was put in your eye at surgery - avoid air travel until you consult your doctor. 4.  You may shave after the third day. 5.  You may watch television, read, cook and wash dishes as long as it does not interfere 
      with special positioning instructions. 6.  Alcoholic beverages may be used in moderation. 7.  No sexual intercourse. 8.  You  may bathe the eyelids daily with cotton or a tissue moistened with warm tap 
     water. Do not bathe the eyeball itself. 9.  Some discharge from the operated eye is to be expected. This should gradually  
     improve. 10. Change the eye pad at least once daily. You may discontinue the eye pad whenever 
      comfortable to do so (usually three days after the operation). Wear the eye shield or 
      glasses at all times. 11. If you experience increasing pain, decreasing vision, or pus like discharge, call the office. 12. Medication Instructions: 
       Prednisolone 1% - one drop in the operated eye 4 times a day. Ofloxacin (antibiotic drop) - one drop in operated eye 4 times a day. BRING ALL EYE DROPS TO YOU POSTOPERATIVE APPOINTMENT! 13. Return appointment at the [facility] On  Friday, January 26 @ 9:30 DISCHARGE SUMMARY from Nurse PATIENT INSTRUCTIONS: 
 
After general anesthesia or intravenous sedation, for 24 hours or while taking prescription Narcotics: · Limit your activities · Do not drive and operate hazardous machinery · Do not make important personal or business decisions · Do  not drink alcoholic beverages · If you have not urinated within 8 hours after discharge, please contact your surgeon on call.  
 
*  Please give a list of your current medications to your Primary Care Provider. *  Please update this list whenever your medications are discontinued, doses are 
    changed, or new medications (including over-the-counter products) are added. *  Please carry medication information at all times in case of emergency situations. These are general instructions for a healthy lifestyle: No smoking/ No tobacco products/ Avoid exposure to second hand smoke Surgeon General's Warning:  Quitting smoking now greatly reduces serious risk to your health. Obesity, smoking, and sedentary lifestyle greatly increases your risk for illness A healthy diet, regular physical exercise & weight monitoring are important for maintaining a healthy lifestyle You may be retaining fluid if you have a history of heart failure or if you experience any of the following symptoms:  Weight gain of 3 pounds or more overnight or 5 pounds in a week, increased swelling in our hands or feet or shortness of breath while lying flat in bed. Please call your doctor as soon as you notice any of these symptoms; do not wait until your next office visit. Recognize signs and symptoms of STROKE: 
 
F-face looks uneven A-arms unable to move or move unevenly S-speech slurred or non-existent T-time-call 911 as soon as signs and symptoms begin-DO NOT go Back to bed or wait to see if you get better-TIME IS BRAIN. Introducing [facility]! Julian Grace introduces [employer] logo patient portal. Now you can access parts of your medical record, email your doctor's office, and request medication refills online. 1. In your internet browser, go to [email or web address] 2. Click on the First Time User? Click Here link in the Sign In box. You will see the New Member Sign Up page. 3. Enter your [employer] logo Access Code exactly as it appears below. You will not need to use this code after youve completed the sign-up process.  If you do not sign up before the expiration date, you must request a new code. · [employer] Access Code: [email or web address] Expires: 4/16/2018  3:19 PM 
 
4. Enter the last four digits of your Social Security Number (xxxx) and Date of Birth (mm/dd/yyyy) as indicated and click Submit. You will be taken to the next sign-up page. 5. Create a [employer] ID. This will be your [employer] login ID and cannot be changed, so think of one that is secure and easy to remember. 6. Create a [employer] password. You can change your password at any time. 7. Enter your Password Reset Question and Answer. This can be used at a later time if you forget your password. 8. Enter your e-mail address. You will receive e-mail notification when new information is available in [address] 9. Click Sign Up. You can now view and download portions of your medical record. 10. Click the Download Summary menu link to download a portable copy of your medical information. If you have questions, please visit the Frequently Asked Questions section of the [employer] website. Remember, [employer] is NOT to be used for urgent needs. For medical emergencies, dial 911. Now available from your iPhone and Android! Providers Seen During Your Hospitalization Provider Specialty Primary office phone Leopold Mola, MD Ophthalmology [phone] Your Primary Care Physician (PCP) Primary Care Physician Office Phone Office Fax Eliot Sanchez [phone] You are allergic to the following Allergen Reactions Other Medication Other (comments) Hayfever, mold, ragweed, environmental and some foods, but patient not sure which ones. Causes him to become stopped up in sinuses per pt. Recent Documentation Weight BMI Smoking Status 66.2 kg 21.25 kg/m2 Current Every Day Smoker Emergency Contacts Name Discharge Info Relation Home Work Mobile Yary Lawson  Other Relative [6] [phone] Kim Hanna  Other Relative [6] [phone] Patient Belongings The following personal items are in your possession at time of discharge: 
  Dental Appliances: None         Home Medications: Kept at bedside   Jewelry: None  Clothing: Pants, Shirt, Undergarments, Socks    Other Valuables: None Please provide this summary of care documentation to your next provider. Signatures-by signing, you are acknowledging that this After Visit Summary has been reviewed with you and you have received a copy. Patient Signature:  ____________________________________________________________ Date:  ____________________________________________________________  
  
Tmei Mealing Provider Signature:  ____________________________________________________________ Date:  ____________________________________________________________

## 2018-01-25 NOTE — BRIEF OP NOTE
BRIEF OPERATIVE NOTE    Date of Procedure: 1/25/2018   Preoperative Diagnosis: Macular puckering, right eye, aphakia [H35.371]  Postoperative Diagnosis: Macular puckering, right eye, aphakia [H35.371]    Procedure(s):  RIGHT VITRECTOMY POSTERIOR 25 GAUGE/SCAR TISSUE REMOVAL, Iridectomy  RIGHT INTRA OCULAR LENS SECONDARY  Surgeon(s) and Role:     * Samir Merino MD - Primary         Assistant Staff: None      Surgical Staff:  Circ-1: Fanny Valle RN  Scrub Tech-1: Romie Shankar  Event Time In   Incision Start 1350   Incision Close 1444     Anesthesia: MAC   Estimated Blood Loss: 0  Specimens: * No specimens in log *   Findings: 0   Complications: 0  Implants:   Implant Name Type Inv.  Item Serial No.  Lot No. LRB No. Used Action   13.0mm length 5.5mm optic 15.0 D Intraocular Lens     [id number] [employer]   Right 1 Implanted

## 2018-01-25 NOTE — ANESTHESIA POSTPROCEDURE EVALUATION
Post-Anesthesia Evaluation and Assessment    Patient: Rg Vasquez MRN: [id number]  SSN: [id number]    YOB: 1955  Age: 60 y.o. Sex: male       Cardiovascular Function/Vital Signs  Visit Vitals    BP (P) 140/83 (BP 1 Location: Left arm, BP Patient Position: At rest)    Pulse 80    Temp 36.6 °C (97.8 °F)    Resp 15    Wt 66.2 kg (146 lb)    SpO2 100%    BMI 21.25 kg/m2       Patient is status post general anesthesia for Procedure(s):  RIGHT VITRECTOMY POSTERIOR 25 GAUGE/SCAR TISSUE REMOVAL, Iridectomy  RIGHT INTRA OCULAR LENS SECONDARY. Nausea/Vomiting: None    Postoperative hydration reviewed and adequate. Pain:  Pain Scale 1: (P) Numeric (0 - 10) (01/25/18 1457)  Pain Intensity 1: (P) 0 (01/25/18 1457)   Managed    Neurological Status:   Neuro (WDL): (P) Exceptions to WDL (01/25/18 1457)   At baseline    Mental Status and Level of Consciousness: Arousable    Pulmonary Status:   O2 Device: Nasal cannula (01/25/18 1457)   Adequate oxygenation and airway patent    Complications related to anesthesia: None    Post-anesthesia assessment completed.  No concerns    Signed By: Bryant Garcia MD     January 25, 2018

## 2018-01-25 NOTE — BRIEF OP NOTE
BRIEF OPERATIVE NOTE    Date of Procedure: 1/25/2018   Preoperative Diagnosis: Macular puckering, right eye [H35.371]  Postoperative Diagnosis: Macular puckering, right eye [H35.371]    Procedure(s):  RIGHT VITRECTOMY POSTERIOR 25 GAUGE/SCAR TISSUE REMOVAL, Iridectomy  RIGHT INTRA OCULAR LENS SECONDARY  Surgeon(s) and Role:     * Jaydon Sanchez MD - Primary         Assistant Staff: None      Surgical Staff:  Circ-1: Alize Loja RN  Scrub Tech-1: Johnathon Cortes  Event Time In   Incision Start 1350   Incision Close 1444     Anesthesia: MAC   Estimated Blood Loss: 00  Specimens: * No specimens in log *   Findings: 0   Complications: 0  Implants:   Implant Name Type Inv.  Item Serial No.  Lot No. LRB No. Used Action   13.0mm length 5.5mm optic 15.0 D Intraocular Lens     [id number] FANNY [employer]   Right 1 Implanted

## 2018-01-25 NOTE — PERIOP NOTES
PACU DISCHARGE NOTE    Vital signs stable, pain well controlled, alert and oriented times three or at baseline, follow up per surgeon, no anesthetic complications. Discharge instructions given to pt and his niece. Pt is without c/o pain or discomfort. Pt is tolerating PO and has had his IV removed intact. Pt to discharge door via wheelchair and left in care of his niece, [place]. Pt states he has all his belongings and no discharge prescriptions given by Dr. Paulette Gaffney.

## 2018-01-25 NOTE — ANESTHESIA PREPROCEDURE EVALUATION
Anesthetic History   No history of anesthetic complications            Review of Systems / Medical History  Patient summary reviewed and pertinent labs reviewed    Pulmonary    COPD: mild      Smoker         Neuro/Psych         TIA and psychiatric history     Cardiovascular    Hypertension: well controlled              Exercise tolerance: >4 METS     GI/Hepatic/Renal     GERD: well controlled           Endo/Other        Arthritis     Other Findings            Physical Exam    Airway  Mallampati: II  TM Distance: > 6 cm  Neck ROM: normal range of motion   Mouth opening: Normal     Cardiovascular    Rhythm: regular           Dental    Dentition: Poor dentition     Pulmonary                 Abdominal  GI exam deferred       Other Findings            Anesthetic Plan    ASA: 3  Anesthesia type: general          Induction: Intravenous  Anesthetic plan and risks discussed with: Patient

## 2018-01-26 NOTE — OP NOTES
[facility]  OPERATIVE REPORT    Rita Cobb  MR#: [id number]  : 1955  ACCOUNT #: [de-identified]   DATE OF SERVICE: 2018    PREOPERATIVE DIAGNOSES:   1. Epiretinal proliferation, visually significant, right eye.  2.  History of 0 retinal tear, right eye. 3.  Aphakia, right eye. POSTOPERATIVE DIAGNOSES:   1. Epiretinal proliferation, visually significant, right eye.  2.  History of 0 retinal tear, right eye. 3.  Aphakia, right eye. PROCEDURES PERFORMED:   1.  Pars plana vitrectomy with stripping of epiretinal proliferation. 2.  Secondary implant lens placement, Lawrence [id number], 15 diopter. 3.  Peripheral iridectomy. SURGEON: Melchor La MD.     ASSISTANT: 0     ANESTHESIA:   Modified. COMPLICATIONS:  None. ESTIMATED BLOOD LOSS: 0    SPECIMENS REMOVED: 0    IMPLANTS: 0     INDICATIONS FOR THE PROCEDURE:  There is significant vision loss as a combination of epiretinal membrane with cystic maculopathy as well as aphakia. After discussing his options, risks and benefits, I recommended vitrectomy to remove surface proliferation and somewhat flatten cystic macula, and then placement of a secondary implant lens. Understanding the risks, benefits, and alternatives, the patient elected to proceed. SUMMARY OF OPERATION: After adequate preoperative evaluation and informed consent had been obtained, the patient was brought to the operative suite. IV access and sedation was obtained without complication. General laryngotracheal mask anesthetic was achieved. A timeout was performed. Retrobulbar injection was given, approximately 4.5 mL of a 50/50 mixture of Marcaine and Xylocaine with Wydase was given for pain control. The eye was prepped and draped in the usual manner for retinal surgery. Lid speculum was placed over the eye. Three separate 25-gauge cannulas were placed. Infusion was verified and turned to a pressure of 35 mmHg.   Prior to infusion creation, I did create a peritomy superiorly and 0 incision, which was some 6 mm in length, placed an anterior chamber lens once the retinal work was done. There is no significant residual vitreous noted with the peripheral vitreous inspection, but surface membrane was present. This was stripped over the central macula, including ILM stripping for just approximately 2 disk areas centrally. There was no trauma with this. Periphery was inspected. Heavy laser and a low buckle was present. I felt this was an excellent result. Attention was then turned to the anterior chamber. The chamber supported viscoelastic through a 15-degree paracentesis and stab incision. The 00 was opened its full extent. Its white-to-white was 12 diopters, so an MTA4 was chosen and inspected and a 15 diopter was verified with a time-out and this was placed into the anterior chamber angle. The wound was closed with 10-0 nylons, a single central nylon and two X-fashion nylon suture. All the viscoelastic was removed from the eye. A single iridectomy was created in some thin iris at approximately 2 o'clock. This was fairly small. No bleeding was seen with this maneuver. At this time, all sclerotomies were closed with 8-0 Vicryl sutures and the wound was rechecked for tightness. Approximately 20 mmHg seemed to maintain good pressure. The anterior chamber wound was tightened and no wound was felt leaking from the paracentesis and it was felt it was an excellent result. The conjunctiva was closed with 8-0 plain and subconjunctival Celestone and Ancef was given inferonasally. The eye was patched and shielded with Maxitrol ointment and Alphagan drops. He was taken to recovery room after awakened from anesthesia in good condition.       MD Ari Barnes / Royce Pineda  D: 01/25/2018 14:49     T: 01/25/2018 15:51  JOB #: [id number]

## (undated) DEVICE — FLEXIBLE IRIS RETRACTORS, 1 UNIT AT 5 HOOKS: Brand: ALCON GRIESHABER

## (undated) DEVICE — KNIFE 20 GAGE MVR STRAIGHT (10/SP): Brand: BEAVER®

## (undated) DEVICE — SURGICAL PROCEDURE PACK EYE CDS

## (undated) DEVICE — CONSTELLATION VISION SYSTEM 5000 CPM ULTRAVIT PROBE STRAIGHT ENDOILLUMINATOR 25+ TOTALPLUS VITRECTOMY PAK EDGEPLUS BLADE VALVED ENTRY SYSTEM: Brand: CONSTELLATION, ULTRAVIT, 25+, TOTALPLUS, EDGEPLUS

## (undated) DEVICE — MVR KNIFE 25 GAUGE: Brand: ALCON

## (undated) DEVICE — SUTURE ETHLN SZ 10-0 L12IN NONABSORBABLE BLK CS160-6 L5.5MM 9000G

## (undated) DEVICE — SOLUTION IRRIGATION BAL SALT SOLUTION 500 ML STRL BSS

## (undated) DEVICE — SUT VCRL 8-0 12IN TG1408 VIO --

## (undated) DEVICE — Z DISCONTINUED NO SUB IDED SHIELD EYE PLAS RT BGE M 7.5CMX5.7CM VISITEC

## (undated) DEVICE — 25+® REVOLUTION DSP ILM FORCEPS: Brand: ALCON GRIESHABER REVOLUTION 25+

## (undated) DEVICE — ADVANCED DSP BACKFLUSH BLUNT, 25G: Brand: ALCON GRIESHABER

## (undated) DEVICE — KNIFE OPHTH 15 DEG 5 MM STAB INCISION PLAS GRN

## (undated) DEVICE — SOLUTION IRRIGATION BAL SALT SOLUTION 500 ML BTL 6/CA BSS +

## (undated) DEVICE — KENDALL SCD EXPRESS SLEEVES, KNEE LENGTH, MEDIUM: Brand: KENDALL SCD

## (undated) DEVICE — PAD,EYE,1-5/8X2 5/8,STERILE,LF,1/PK: Brand: MEDLINE

## (undated) DEVICE — SUTURE ETHLN SZ 5-0 L18IN NONABSORBABLE BLK S-14 L8MM 1/4 7731G

## (undated) DEVICE — CARDINAL HEALTH FLEXIBLE LIGHT HANDLE COVER: Brand: CARDINAL HEALTH

## (undated) DEVICE — 3M™ TEGADERM™ TRANSPARENT FILM DRESSING FRAME STYLE, 1628, 6 IN X 8 IN (15 CM X 20 CM), 10/CT 8CT/CASE: Brand: 3M™ TEGADERM™

## (undated) DEVICE — COVER,MAYO STAND,STERILE: Brand: MEDLINE

## (undated) DEVICE — SOLUTION IRRIG 1000ML H2O STRL BLT

## (undated) DEVICE — (D)STRIP SKN CLSR 0.5X4IN WHT --

## (undated) DEVICE — BIPOLAR FORCEPS CORD,BANANA LEADS: Brand: VALLEYLAB

## (undated) DEVICE — NEEDLE HYPO 27GA L1.25IN GRY POLYPR HUB S STL REG BVL STR

## (undated) DEVICE — SYR 50ML LR LCK 1ML GRAD NSAF --

## (undated) DEVICE — CANNULA OPHTH 2 BOR 25 GA 0.5 MM SS

## (undated) DEVICE — SATINCRESCENT® KNIFE ANGLED BEVEL UP: Brand: SATINCRESCENT®

## (undated) DEVICE — SUTURE PERMAHAND SZ 2-0 L12X18IN NONABSORBABLE BLK SILK A185H

## (undated) DEVICE — SYR 10ML LUER LOK 1/5ML GRAD --

## (undated) DEVICE — SUTURE MERSLEN 5-0 18IN RD-1 746G

## (undated) DEVICE — IRRIGATION KT OPHTH 80IN --

## (undated) DEVICE — SUTURE ABSORBABLE MONOFILAMENT 6-0 G-1 18 IN PLN GUT 770G

## (undated) DEVICE — APPLICATOR COT TIP 3IN WOOD --

## (undated) DEVICE — DISPOSABLE BIPOLAR PENCIL 5" (12.7CM) 25 GAUGE STRAIGHT: Brand: KIRWAN

## (undated) DEVICE — Device

## (undated) DEVICE — PACK VITRCTMY CASS 1500 PRB IV IRRIG ADMN LN UTIL LN 4MM INF

## (undated) DEVICE — VGFI TUBING SET: Brand: VGFI

## (undated) DEVICE — SUTURE VCRL SZ 7-0 L18IN ABSRB VLT L6.5MM TG140-8 3/8 CIR J546G

## (undated) DEVICE — Device: Brand: MILLEX-OR

## (undated) DEVICE — SATINSLIT® KNIFE 3.2MM ANGLED: Brand: SATINSLIT®

## (undated) DEVICE — 20 GA FRAGMENTATION PAK: Brand: CONSTELLATION

## (undated) DEVICE — CANNULA ASPIR 25 GAX32 MM RETINAL LUER LCK HUB

## (undated) DEVICE — (D)SYR 10ML 1/5ML GRAD NSAF -- PKGING CHANGE USE ITEM 338027